# Patient Record
Sex: FEMALE | Race: WHITE
[De-identification: names, ages, dates, MRNs, and addresses within clinical notes are randomized per-mention and may not be internally consistent; named-entity substitution may affect disease eponyms.]

---

## 2018-03-20 ENCOUNTER — HOSPITAL ENCOUNTER (OUTPATIENT)
Dept: HOSPITAL 47 - RADUSWWP | Age: 28
Discharge: HOME | End: 2018-03-20
Payer: COMMERCIAL

## 2018-03-20 DIAGNOSIS — N60.02: Primary | ICD-10-CM

## 2018-03-20 NOTE — USB
Reason for exam: clinical finding.



Physical Findings:

Nurse Summary: Patient complains of bilateral breast pain x 2 weeks, 0.5cm nodule 

right breast at 11 o'clock and left breast at 5 o'clock (nurse mj).



US Breast Limited BILAT

Right breast ultrasound demonstrates a 0.6 x 0.3 x 0.4cm oval, cystic lesion at 10

o'clock, a 0.3 x 0.2 x 0.2cm oval lesion too small to characterize at 11 o'clock 

and ductal ectasia at the posterior nipple.



Left breast ultrasound demonstrates a 0.3 x 0.4 x 0.2cm oval lesion too small to 

characterize at 5 o'clock, a 0.2 x 0.3 x 0.2cm oval lesion too small to 

characterize at 9 o'clock and a 0.4 x 0.6 x 0.2cm oval, cystic cluster with 

calcification at 3 o'clock.



These results were verbally communicated with the patient and result sheet given 

to the patient on 3/20/18.





ASSESSMENT: Benign, BI-RAD 2



RECOMMENDATION:

Routine screening mammogram of both breasts at age 40.

Or sooner if clinically indicated.

Manage patient on a clinical basis.

## 2022-07-12 ENCOUNTER — HOSPITAL ENCOUNTER (OUTPATIENT)
Dept: HOSPITAL 47 - LABPAT | Age: 32
Discharge: HOME | End: 2022-07-12
Attending: OBSTETRICS & GYNECOLOGY
Payer: COMMERCIAL

## 2022-07-12 DIAGNOSIS — Z30.2: Primary | ICD-10-CM

## 2022-07-12 LAB
BASOPHILS # BLD AUTO: 0.04 X 10*3/UL (ref 0–0.1)
BASOPHILS NFR BLD AUTO: 0.5 %
EOSINOPHIL # BLD AUTO: 0.16 X 10*3/UL (ref 0.04–0.35)
EOSINOPHIL NFR BLD AUTO: 1.9 %
ERYTHROCYTE [DISTWIDTH] IN BLOOD BY AUTOMATED COUNT: 5.01 X 10*6/UL (ref 4.1–5.2)
ERYTHROCYTE [DISTWIDTH] IN BLOOD: 12.2 % (ref 11.5–14.5)
HCT VFR BLD AUTO: 45.7 % (ref 37.2–46.3)
HGB BLD-MCNC: 14.5 G/DL (ref 12–15)
IMM GRANULOCYTES BLD QL AUTO: 0.2 %
LYMPHOCYTES # SPEC AUTO: 2.62 X 10*3/UL (ref 0.9–5)
LYMPHOCYTES NFR SPEC AUTO: 30.8 %
MCH RBC QN AUTO: 28.9 PG (ref 27–32)
MCHC RBC AUTO-ENTMCNC: 31.7 G/DL (ref 32–37)
MCV RBC AUTO: 91.2 FL (ref 80–97)
MONOCYTES # BLD AUTO: 0.58 X 10*3/UL (ref 0.2–1)
MONOCYTES NFR BLD AUTO: 6.8 %
NEUTROPHILS # BLD AUTO: 5.09 X 10*3/UL (ref 1.8–7.7)
NEUTROPHILS NFR BLD AUTO: 59.8 %
NRBC BLD AUTO-RTO: 0 /100 WBCS (ref 0–0)
PLATELET # BLD AUTO: 264 X 10*3/UL (ref 140–440)
WBC # BLD AUTO: 8.51 X 10*3/UL (ref 4.5–10)

## 2022-07-12 PROCEDURE — 85025 COMPLETE CBC W/AUTO DIFF WBC: CPT

## 2022-08-01 ENCOUNTER — HOSPITAL ENCOUNTER (OUTPATIENT)
Dept: HOSPITAL 47 - OR | Age: 32
Discharge: HOME | End: 2022-08-01
Attending: OBSTETRICS & GYNECOLOGY
Payer: COMMERCIAL

## 2022-08-01 VITALS — BODY MASS INDEX: 26.9 KG/M2

## 2022-08-01 VITALS — HEART RATE: 80 BPM | SYSTOLIC BLOOD PRESSURE: 118 MMHG | DIASTOLIC BLOOD PRESSURE: 72 MMHG

## 2022-08-01 VITALS — TEMPERATURE: 97.5 F | RESPIRATION RATE: 16 BRPM

## 2022-08-01 DIAGNOSIS — Z79.3: ICD-10-CM

## 2022-08-01 DIAGNOSIS — K21.9: ICD-10-CM

## 2022-08-01 DIAGNOSIS — Z30.2: Primary | ICD-10-CM

## 2022-08-01 PROCEDURE — 81025 URINE PREGNANCY TEST: CPT

## 2022-08-01 PROCEDURE — 58671 LAPAROSCOPY TUBAL BLOCK: CPT

## 2022-08-01 RX ADMIN — HYDROMORPHONE HYDROCHLORIDE PRN MG: 1 INJECTION, SOLUTION INTRAMUSCULAR; INTRAVENOUS; SUBCUTANEOUS at 10:10

## 2022-08-01 RX ADMIN — HYDROMORPHONE HYDROCHLORIDE PRN MG: 1 INJECTION, SOLUTION INTRAMUSCULAR; INTRAVENOUS; SUBCUTANEOUS at 09:21

## 2022-08-01 RX ADMIN — HYDROMORPHONE HYDROCHLORIDE PRN MG: 1 INJECTION, SOLUTION INTRAMUSCULAR; INTRAVENOUS; SUBCUTANEOUS at 09:28

## 2022-08-01 NOTE — P.HPOB
History of Present Illness


H&P Date: 22


Chief Complaint: Family planning, undesired fertility





This is a 32-year-old  0 that request tubal ligation.  Patient is 

currently using Depo-Provera for contraception.  She is amenorrheic with Depo-

Provera.  She desires permanent sterilization.





Review of Systems


Constitutional: Denies chills, Denies fatigue, Denies fever


Ears, nose, mouth and throat: Denies headache


Cardiovascular: Denies leg edema


Respiratory: Denies dyspnea


Gastrointestinal: Denies nausea, Denies vomiting


Genitourinary: Denies pregnant





Past Medical History


Past Medical History: No Reported History


History of Any Multi-Drug Resistant Organisms: None Reported


Past Surgical History: No Surgical Hx Reported


Past Anesthesia/Blood Transfusion Reactions: No Reported Reaction


Additional Past Anesthesia/Blood Transfusion Reaction / Comment(s): NO PRIOR SX 

HX


Smoking Status: Never smoker





- Past Family History


  ** Mother


Family Medical History: No Reported History





Medications and Allergies


                                Home Medications











 Medication  Instructions  Recorded  Confirmed  Type


 


medroxyPROGESTERone [Depo-Provera] 150 mg IM AS DIRECTED 22 

History








                                    Allergies











Allergy/AdvReac Type Severity Reaction Status Date / Time


 


No Known Allergies Allergy   Verified 22 07:20














Exam


Osteopathic Statement: *.  No significant issues noted on an osteopathic 

structural exam other than those noted in the History and Physical/Consult.


                                   Vital Signs











  Temp Pulse Resp BP Pulse Ox


 


 22 07:29  97.5 F L  91  16  133/87  97








                                Intake and Output











 22





 22:59 06:59 14:59


 


Other:   


 


  Weight   79.5 kg














Targeted physical exam is performed in this date and generalist a well-nourished

well-developed nonpregnant female in no acute distress, breathing is noted to 

nonlabored, heart has a regular rate and rhythm, abdomen is soft and nontender, 

genitourinary exam is deferred.





Assessment and Plan


(1) Family planning


Current Visit: Yes   Status: Acute   Code(s): Z30.09 - ENCOUNTER FOR OTH GENERAL

CNSL AND ADVICE ON CONTRACEPTION   SNOMED Code(s): 527911571


   


Plan: 





32-year-old  0 that presents for  scope tubal ligation with Filshie

clips.  Patient is desirous of permanent sterilization.  Received her is 

reviewed and questions are answered.  Patient states understanding.  ACOG 

pamphlet is given to patient to review in addition.  We'll proceed with a 

scope tubal ligation with Filshie clips

## 2022-08-01 NOTE — P.OP
Date of Procedure: 08/01/22


Preoperative Diagnosis: 


family planning, undesired fertility


Postoperative Diagnosis: 


same


Procedure(s) Performed: 


 scope tubal ligation with Filshie clips


Anesthesia: GETA


Surgeon: Peggy Sparrow


Estimated Blood Loss (ml): 5


IV fluids (ml): 800


Urine output (ml): 50


Pathology: none sent


Condition: stable


Disposition: PACU


Indications for Procedure: 


Patient desires permanent sterilization


Operative Findings: 


Normal pelvic anatomy


Description of Procedure: 


Patient was taken back to the operating suite where general anesthesia was 

obtained without difficulty by the anesthesia department.  She is prepped and 

draped in normal sterile fashion in the dorsal lithotomy position.  Red rubber 

catheter was used to drain the bladder of clear yellow urine.  A Graves speculum

was placed the cervix is visualized the anterior lip was grasped with a single-

tooth tenaculum.  The acorn uterine manipulator was advanced into the cervix as 

a means to me that the uterus throughout the procedure.  Attention then turned 

the patient's abdomen where in the umbilical fold a small incision is made.  The

Veress needles placed through this incision and toward the abdominal cavity.  On

ce the Veress needle is deemed to be in the proper position with a drop of CO2 

pressure with insufflation of CO2 gas CO2 insufflation was allowed to occur.  

Proximally 3 L of gas or used to obtain pneumoperitoneum.  At this time a 5 mm 

trocar and sleeve is placed through the incision with the laparoscope in place 

toward the pneumoperitoneum.  The above-noted findings are visualized.  At this 

time and additional port sites was placed in the right mid quadrant this is a 5 

mm trocar and sleeve which is placed under direct visualization.  The Filshie 

clip applicator was then placed through the side incision the right fallopian 

tube is evaluated followed out to the fimbriated and grasped with the Filshie 

clip applicator.  Complete transection of the fallopian tube was appreciated.  

This was then repeated on the opposite side.  Pictures are taken.  At this time 

all inserts removed from the patient's abdomen attention then turned the 

patient's skin incisions were closed with 4-0 Vicryl in a septic fashion Steri-

Strips and sterile dressings were applied lidocaine was instilled.  The fetal 

tooth tenaculum was taken off of the anterior lip of the cervix a gurney to 

manipulate her was removed.  All counts were noted be correct 2 at the end of 

the procedure.  Patient tolerated procedure well and was taken the recovery room

awake in stable condition.